# Patient Record
(demographics unavailable — no encounter records)

---

## 2024-12-05 NOTE — ASSESSMENT
[FreeTextEntry1] : Attention deficit disorder. Well controlled on Adderall 20 mg twice. Medication renewed. she does not take it every day and usually only takes half a pill in the afternoon.  Migraine headaches.  Off the Emgality and rizatriptan with no significant headaches.  Routine followup here in 6 months, sooner for any new or concerning symptoms.

## 2024-12-05 NOTE — PHYSICAL EXAM
[FreeTextEntry1] :  Mental status: Alert, fully oriented, speech comprehension intact, recent memory intact  Cranial nerves: No ptosis  Extraocular movements full. Facial strength and sensation are normal. Tongue midline.  Motor: Strength grossly normal throughout. There is no drift.  No abnormal movements observed.  Sensation: Intact to touch throughout  Coordination: Finger-nose intact  Reflexes Symmetrical, normal active  Gait  normal.  Romberg absent.

## 2024-12-05 NOTE — HISTORY OF PRESENT ILLNESS
[FreeTextEntry1] : I have been following her for attention deficit disorder. She is currently doing well on Adderall 20 mg twice a day. She works as a [employer] manager and has no trouble functioning either at work or outside work.  She does not take the Adderall every day.  Most days she will use 20 mg of Adderall in the morning and then half a pill, namely 10 mg in the afternoon  She has a long history of migraine type headaches.  She had been on Emgality and rizatriptan but now she is off those medications and has not been getting any migraine headaches.  Also has a long history of neck and back pain She has been seeing a physiatrist

## 2024-12-11 NOTE — DATA REVIEWED
[FreeTextEntry1] : EXAM: [id number] - MR SPINE THORACIC  - ORDERED BY: LARISSA MCBRIDE   PROCEDURE DATE:  08/19/2024    INTERPRETATION:  Clinical indication: Back pain.  MRI of the thoracic spine was performed sagittal T2 and STIR sequences. Axial T2 and coronal T2-weighted sequence was performed  The vertebral body height alignment and marrow signal appears normal.  The disc spaces appear preserved  There are no abnormal disc herniations or significant central or neural foraminal stenosis is seen.  The spinal cord demonstrates normal signal and caliber.  Evaluation paraspinal soft tissues appear normal.  IMPRESSION: Unremarkable MRI of the thoracic spine.  --- End of Report ---       ALESSANDRO ALLAN MD; Attending Radiologist This document has been electronically signed. Aug 26 2024  4:54PM

## 2024-12-11 NOTE — PHYSICAL EXAM
[FreeTextEntry1] : PE: Constitutional: In NAD, calm and cooperative MSK (Neck/Back)  Inspection: no gross swelling identified  Palpation: Tenderness of the left lower lumbar paraspinals, R midthoracic paraspinals and R>L trapezius/cervical paraspinals  ROM: Pain at end lumbar extension>flexion, pain with R lateral rotation of neck  Strength: 5/5 strength in bilateral upper and lower extremities  Reflexes: 2+ reflexes in bilateral UE and LE, negative clonus bilaterally  Sensation: Intact to light touch in bilateral upper and lower extremities Special tests: SLR: equivocal on left, negative on right SONA: negative bilaterally FADIR: negative bilaterally Facet loading: negative bilaterally.

## 2024-12-11 NOTE — ASSESSMENT
[FreeTextEntry1] : Ms. YUNIEL LEYVA is a 32 year old female who presents with chronic low back pain, as well as chronic midback and neck pain. Pain is likely myofascial in nature but has been chronic despite HEP and NSAIDs. Of note, she was found to have a possible syrinx on recent MRI C Spine. Denies any red flag signs. Will recommend: - TPI performed today, tolerated well - Will reorder MRI T Spine given persistent pain and syrinx seen on MRI C Spine - Patient to continue HEP  RTC in 4 weeks. Patient aware of red flag signs including any changes to their bowel/bladder control, groin numbness or new weakness. Patient knows to seek immediate attention by calling 911 or going to nearest ER if these symptoms appear.  This patient is being managed for a complex chronic pain that requires ongoing medical management. The nature of this condition requires a longitudinal relationship and monitoring over time for appropriate treatment. Composite Graft Text: The defect edges were debeveled with a #15 scalpel blade.  Given the location of the defect, shape of the defect, the proximity to free margins and the fact the defect was full thickness a composite graft was deemed most appropriate.  The defect was outline and then transferred to the donor site.  A full thickness graft was then excised from the donor site. The graft was then placed in the primary defect, oriented appropriately and then sutured into place.  The secondary defect was then repaired using a primary closure.

## 2024-12-11 NOTE — HISTORY OF PRESENT ILLNESS
[FreeTextEntry1] : Ms. YUNIEL LEYVA is a 32 year old female who presents for follow up. At last visit, she was given TPI, reordered MRI T Spine and continued on HEP.    Location: Low back, R=L but also R midback and R neck Onset:Did have a trampoline accident when she was in 5th grade for which she thinks hyperextended her back. She has been having on and off low back pain since then. Says her neck and midback is chronic, no inciting events. Provocation/Palliative: Nothing in particular makes it worse, better with deep massage Quality: Shooting Radiation: Can radiate down her LLE at times but mostly in back itself, no radiation down arms Severity: Mod-severe Timing: Not improving with time  No bowel/bladder changes. No groin numbness.

## 2024-12-11 NOTE — ASSESSMENT
[FreeTextEntry1] : Ms. YUNIEL LEYVA is a 32 year old female who presents with chronic low back pain, as well as chronic midback and neck pain. Pain is likely myofascial in nature but has been chronic despite HEP and NSAIDs. Of note, she was found to have a possible syrinx on recent MRI C Spine. Denies any red flag signs. Will recommend: - TPI performed today, tolerated well - Will reorder MRI T Spine given persistent pain and syrinx seen on MRI C Spine - Patient to continue HEP  RTC in 4 weeks. Patient aware of red flag signs including any changes to their bowel/bladder control, groin numbness or new weakness. Patient knows to seek immediate attention by calling 911 or going to nearest ER if these symptoms appear.  This patient is being managed for a complex chronic pain that requires ongoing medical management. The nature of this condition requires a longitudinal relationship and monitoring over time for appropriate treatment.

## 2024-12-11 NOTE — DATA REVIEWED
[FreeTextEntry1] : EXAM: [id number] - MR SPINE THORACIC  - ORDERED BY: LARISSA MCBRIDE   PROCEDURE DATE:  08/19/2024    INTERPRETATION:  Clinical indication: Back pain.  MRI of the thoracic spine was performed sagittal T2 and STIR sequences. Axial T2 and coronal T2-weighted sequence was performed  The vertebral body height alignment and marrow signal appears normal.  The disc spaces appear preserved  There are no abnormal disc herniations or significant central or neural foraminal stenosis is seen.  The spinal cord demonstrates normal signal and caliber.  Evaluation paraspinal soft tissues appear normal.  IMPRESSION: Unremarkable MRI of the thoracic spine.  --- End of Report ---       ALESSANDRO ALLAN MD; Attending Radiologist This document has been electronically signed. Aug 26 2024  4:54PM
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
## 2025-02-19 NOTE — PHYSICAL EXAM
[FreeTextEntry1] : PE: Constitutional: In NAD, calm and cooperative MSK (Neck):  Inspection: no gross swelling identified  Palpation: Tenderness of the R rhomboids, levator scapulae and trapezius  ROM: Pain at end cervical extension and with R>L lateral rotation but also with restricted R shoulder abduction  Strength: 5/5 strength in bilateral upper extremities  Reflexes: 2+ Biceps/Brachioradialis/Triceps/patella/Achilles reflex bilaterally, Hoffmans/Clonus negative bilaterally  Sensation: Intact to light touch in bilateral upper extremities  Special tests: Spurlings test negative bilaterally
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
## 2025-02-19 NOTE — PROCEDURE
[de-identified] :  Procedure: Trigger Point Injections Physician: Dr. Allen Medication injected: Lidocaine 1%, 5cc total Sedation medications: None Estimated blood loss: None Complications: None   Technique: R/B/A to trigger point injection reviewed with patient. The patient is agreeable and wishes to proceed.   The patient was placed in the seated position and trigger points of the right trapezius, levator scapulae and rhomboids were identified. The area was prepped in normal sterile fashion with Chloroprep. A 27 gauge 1.25 inch needle was advanced into the palpable trigger points with reproduction of pain. After negative aspiration of heme, the above medications were injected into the trigger areas. Needle was then removed, bandaid placed over injection sites. There was no complications, the patient was provided with post injection instructions.
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
## 2025-02-19 NOTE — PROCEDURE
[de-identified] :  Procedure: Trigger Point Injections Physician: Dr. Allen Medication injected: Lidocaine 1%, 5cc total Sedation medications: None Estimated blood loss: None Complications: None   Technique: R/B/A to trigger point injection reviewed with patient. The patient is agreeable and wishes to proceed.   The patient was placed in the seated position and trigger points of the right trapezius, levator scapulae and rhomboids were identified. The area was prepped in normal sterile fashion with Chloroprep. A 27 gauge 1.25 inch needle was advanced into the palpable trigger points with reproduction of pain. After negative aspiration of heme, the above medications were injected into the trigger areas. Needle was then removed, bandaid placed over injection sites. There was no complications, the patient was provided with post injection instructions.
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
